# Patient Record
Sex: FEMALE | Race: WHITE | NOT HISPANIC OR LATINO | Employment: STUDENT | URBAN - METROPOLITAN AREA
[De-identification: names, ages, dates, MRNs, and addresses within clinical notes are randomized per-mention and may not be internally consistent; named-entity substitution may affect disease eponyms.]

---

## 2019-04-11 ENCOUNTER — OFFICE VISIT (OUTPATIENT)
Dept: FAMILY MEDICINE CLINIC | Facility: CLINIC | Age: 24
End: 2019-04-11
Payer: COMMERCIAL

## 2019-04-11 VITALS
SYSTOLIC BLOOD PRESSURE: 112 MMHG | HEART RATE: 84 BPM | WEIGHT: 130 LBS | BODY MASS INDEX: 23.92 KG/M2 | TEMPERATURE: 99 F | HEIGHT: 62 IN | DIASTOLIC BLOOD PRESSURE: 70 MMHG | RESPIRATION RATE: 16 BRPM

## 2019-04-11 DIAGNOSIS — K64.5 EXTERNAL THROMBOSED HEMORRHOIDS: Primary | ICD-10-CM

## 2019-04-11 PROCEDURE — 99203 OFFICE O/P NEW LOW 30 MIN: CPT | Performed by: NURSE PRACTITIONER

## 2019-06-17 ENCOUNTER — OFFICE VISIT (OUTPATIENT)
Dept: FAMILY MEDICINE CLINIC | Facility: CLINIC | Age: 24
End: 2019-06-17
Payer: COMMERCIAL

## 2019-06-17 VITALS
HEIGHT: 62 IN | RESPIRATION RATE: 16 BRPM | SYSTOLIC BLOOD PRESSURE: 104 MMHG | TEMPERATURE: 97.7 F | HEART RATE: 84 BPM | WEIGHT: 131 LBS | BODY MASS INDEX: 24.11 KG/M2 | DIASTOLIC BLOOD PRESSURE: 70 MMHG

## 2019-06-17 DIAGNOSIS — Z11.3 SCREENING FOR STD (SEXUALLY TRANSMITTED DISEASE): ICD-10-CM

## 2019-06-17 DIAGNOSIS — Z11.1 ENCOUNTER FOR PPD TEST: ICD-10-CM

## 2019-06-17 DIAGNOSIS — Z12.4 SCREENING FOR CERVICAL CANCER: ICD-10-CM

## 2019-06-17 DIAGNOSIS — Z23 NEED FOR VACCINATION: ICD-10-CM

## 2019-06-17 DIAGNOSIS — Z00.00 ANNUAL PHYSICAL EXAM: Primary | ICD-10-CM

## 2019-06-17 PROCEDURE — 90715 TDAP VACCINE 7 YRS/> IM: CPT

## 2019-06-17 PROCEDURE — 99395 PREV VISIT EST AGE 18-39: CPT | Performed by: NURSE PRACTITIONER

## 2019-06-17 PROCEDURE — 90472 IMMUNIZATION ADMIN EACH ADD: CPT

## 2019-06-17 PROCEDURE — 86580 TB INTRADERMAL TEST: CPT

## 2019-06-17 PROCEDURE — 90471 IMMUNIZATION ADMIN: CPT

## 2019-06-17 PROCEDURE — 90621 MENB-FHBP VACC 2/3 DOSE IM: CPT

## 2019-06-19 ENCOUNTER — CLINICAL SUPPORT (OUTPATIENT)
Dept: FAMILY MEDICINE CLINIC | Facility: CLINIC | Age: 24
End: 2019-06-19

## 2019-06-19 DIAGNOSIS — Z23 NEED FOR VACCINATION: Primary | ICD-10-CM

## 2019-06-19 LAB
INDURATION: 0 MM
TB SKIN TEST: NEGATIVE

## 2019-06-20 LAB
C TRACH RRNA CVX QL NAA+PROBE: NEGATIVE
CYTOLOGIST CVX/VAG CYTO: NORMAL
DX ICD CODE: NORMAL
Lab: NORMAL
N GONORRHOEA RRNA CVX QL NAA+PROBE: NEGATIVE
OTHER STN SPEC: NORMAL
OTHER STN SPEC: NORMAL
PATH REPORT.FINAL DX SPEC: NORMAL
SL AMB NOTE:: NORMAL
SL AMB SPECIMEN ADEQUACY: NORMAL
SL AMB TEST METHODOLOGY: NORMAL
T VAGINALIS RRNA SPEC QL NAA+PROBE: NEGATIVE

## 2019-08-06 ENCOUNTER — TELEPHONE (OUTPATIENT)
Dept: FAMILY MEDICINE CLINIC | Facility: CLINIC | Age: 24
End: 2019-08-06

## 2019-08-06 DIAGNOSIS — Z13.6 SCREENING FOR CARDIOVASCULAR CONDITION: ICD-10-CM

## 2019-08-06 DIAGNOSIS — L67.8 ABNORMAL FACIAL HAIR: ICD-10-CM

## 2019-08-06 DIAGNOSIS — N92.6 IRREGULAR MENSTRUATION: Primary | ICD-10-CM

## 2019-08-06 NOTE — TELEPHONE ENCOUNTER
Left a message on machine letting pt know order Is up front for  and I wrote fasting on the lab slip  No further action needed   Merle Stoner, [place]

## 2019-08-06 NOTE — TELEPHONE ENCOUNTER
Patient called and stated that she saw Amanda Garcia in June and at that time Amanda Garcia offered to order routine blood work and hormonal blood work  She would like to go for that blood work now  Please order routine and hormonal blood work for patient and call Cell when ready for       Thank you

## 2019-08-14 ENCOUNTER — TELEPHONE (OUTPATIENT)
Dept: FAMILY MEDICINE CLINIC | Facility: CLINIC | Age: 24
End: 2019-08-14

## 2019-08-14 DIAGNOSIS — N92.6 IRREGULAR MENSTRUATION: Primary | ICD-10-CM

## 2019-08-14 DIAGNOSIS — L67.8 ABNORMAL FACIAL HAIR: ICD-10-CM

## 2019-08-14 NOTE — TELEPHONE ENCOUNTER
Can you please call [facility] and check if they can add testosterone and DHEA levels to current blood work  Thanks   ESTEPHANIA Jaramillo

## 2019-08-14 NOTE — TELEPHONE ENCOUNTER
They added on DHEA however cannot add testosterone because no red top tube was drawn  [facility]

## 2019-08-16 ENCOUNTER — TELEPHONE (OUTPATIENT)
Dept: FAMILY MEDICINE CLINIC | Facility: CLINIC | Age: 24
End: 2019-08-16

## 2019-08-16 LAB
ALBUMIN SERPL-MCNC: 4.9 G/DL (ref 3.6–5.1)
ALBUMIN/GLOB SERPL: 1.6 (CALC) (ref 1–2.5)
ALP SERPL-CCNC: 67 U/L (ref 33–115)
ALT SERPL-CCNC: 12 U/L (ref 6–29)
AST SERPL-CCNC: 21 U/L (ref 10–30)
BASOPHILS # BLD AUTO: 34 CELLS/UL (ref 0–200)
BASOPHILS NFR BLD AUTO: 0.4 %
BILIRUB SERPL-MCNC: 0.8 MG/DL (ref 0.2–1.2)
BUN SERPL-MCNC: 12 MG/DL (ref 7–25)
BUN/CREAT SERPL: ABNORMAL (CALC) (ref 6–22)
CALCIUM SERPL-MCNC: 10.7 MG/DL (ref 8.6–10.2)
CHLORIDE SERPL-SCNC: 102 MMOL/L (ref 98–110)
CHOLEST SERPL-MCNC: 205 MG/DL
CHOLEST/HDLC SERPL: 2.9 (CALC)
CO2 SERPL-SCNC: 30 MMOL/L (ref 20–32)
CREAT SERPL-MCNC: 0.68 MG/DL (ref 0.5–1.1)
DHEA-S SERPL-MCNC: 622 MCG/DL (ref 18–391)
EOSINOPHIL # BLD AUTO: 298 CELLS/UL (ref 15–500)
EOSINOPHIL NFR BLD AUTO: 3.5 %
ERYTHROCYTE [DISTWIDTH] IN BLOOD BY AUTOMATED COUNT: 11.7 % (ref 11–15)
ESTRADIOL SERPL-MCNC: 253 PG/ML
FSH SERPL-ACNC: 4.8 MIU/ML
GLOBULIN SER CALC-MCNC: 3 G/DL (CALC) (ref 1.9–3.7)
GLUCOSE SERPL-MCNC: 83 MG/DL (ref 65–99)
HCT VFR BLD AUTO: 42.9 % (ref 35–45)
HDLC SERPL-MCNC: 70 MG/DL
HGB BLD-MCNC: 14.1 G/DL (ref 11.7–15.5)
LDLC SERPL CALC-MCNC: 118 MG/DL (CALC)
LH SERPL-ACNC: 21.5 MIU/ML
LYMPHOCYTES # BLD AUTO: 1479 CELLS/UL (ref 850–3900)
LYMPHOCYTES NFR BLD AUTO: 17.4 %
MCH RBC QN AUTO: 29.1 PG (ref 27–33)
MCHC RBC AUTO-ENTMCNC: 32.9 G/DL (ref 32–36)
MCV RBC AUTO: 88.6 FL (ref 80–100)
MONOCYTES # BLD AUTO: 774 CELLS/UL (ref 200–950)
MONOCYTES NFR BLD AUTO: 9.1 %
NEUTROPHILS # BLD AUTO: 5916 CELLS/UL (ref 1500–7800)
NEUTROPHILS NFR BLD AUTO: 69.6 %
NONHDLC SERPL-MCNC: 135 MG/DL (CALC)
PLATELET # BLD AUTO: 372 THOUSAND/UL (ref 140–400)
PMV BLD REES-ECKER: 10.4 FL (ref 7.5–12.5)
POTASSIUM SERPL-SCNC: 4.8 MMOL/L (ref 3.5–5.3)
PROLACTIN SERPL-MCNC: 14.7 NG/ML
PROT SERPL-MCNC: 7.9 G/DL (ref 6.1–8.1)
RBC # BLD AUTO: 4.84 MILLION/UL (ref 3.8–5.1)
REF LAB TEST NAME: NORMAL
REF LAB TEST: NORMAL
SL AMB CLIENT CONTACT: NORMAL
SL AMB EGFR AFRICAN AMERICAN: 144 ML/MIN/1.73M2
SL AMB EGFR NON AFRICAN AMERICAN: 124 ML/MIN/1.73M2
SODIUM SERPL-SCNC: 139 MMOL/L (ref 135–146)
TRIGL SERPL-MCNC: 73 MG/DL
TSH SERPL-ACNC: 1.62 MIU/L
WBC # BLD AUTO: 8.5 THOUSAND/UL (ref 3.8–10.8)

## 2019-08-16 NOTE — TELEPHONE ENCOUNTER
Patient called and blood work discussed  Advised on diet and exercise  Waiting for DHEA and will go for testosterone levels  If everything comes back normal, next will be following with Dr Kal Galindo (Endo)   ESTEPHANIA Brown

## 2019-08-20 ENCOUNTER — TELEPHONE (OUTPATIENT)
Dept: FAMILY MEDICINE CLINIC | Facility: CLINIC | Age: 24
End: 2019-08-20

## 2019-08-20 DIAGNOSIS — R79.89 ELEVATED DHEA: Primary | ICD-10-CM

## 2019-08-20 NOTE — TELEPHONE ENCOUNTER
Patient called back and DHEA results discussed, have not done the testosterone levels and will follow up with ESTEPHANIA Hernandez

## 2019-09-14 LAB — TESTOST SERPL-MCNC: 41 NG/DL (ref 2–45)

## 2019-09-24 ENCOUNTER — OFFICE VISIT (OUTPATIENT)
Dept: FAMILY MEDICINE CLINIC | Facility: CLINIC | Age: 24
End: 2019-09-24
Payer: COMMERCIAL

## 2019-09-24 VITALS
SYSTOLIC BLOOD PRESSURE: 106 MMHG | DIASTOLIC BLOOD PRESSURE: 68 MMHG | WEIGHT: 125 LBS | HEART RATE: 88 BPM | HEIGHT: 62 IN | TEMPERATURE: 103 F | BODY MASS INDEX: 23 KG/M2 | RESPIRATION RATE: 16 BRPM

## 2019-09-24 DIAGNOSIS — J02.9 EXUDATIVE PHARYNGITIS: Primary | ICD-10-CM

## 2019-09-24 LAB — S PYO AG THROAT QL: NEGATIVE

## 2019-09-24 PROCEDURE — 99213 OFFICE O/P EST LOW 20 MIN: CPT | Performed by: NURSE PRACTITIONER

## 2019-09-24 PROCEDURE — 87880 STREP A ASSAY W/OPTIC: CPT | Performed by: NURSE PRACTITIONER

## 2019-09-24 RX ORDER — AZITHROMYCIN 250 MG/1
TABLET, FILM COATED ORAL
Qty: 6 TABLET | Refills: 0 | Status: SHIPPED | OUTPATIENT
Start: 2019-09-24 | End: 2019-09-24

## 2019-09-24 RX ORDER — AMOXICILLIN 875 MG/1
875 TABLET, COATED ORAL 2 TIMES DAILY
Qty: 20 TABLET | Refills: 0 | Status: CANCELLED | OUTPATIENT
Start: 2019-09-24 | End: 2019-10-04

## 2019-09-24 RX ORDER — AZITHROMYCIN 200 MG/5ML
POWDER, FOR SUSPENSION ORAL
Qty: 38 ML | Refills: 0 | Status: SHIPPED | OUTPATIENT
Start: 2019-09-24 | End: 2019-09-29

## 2019-09-24 NOTE — PROGRESS NOTES
Assessment/Plan:    Have high fever, will only treat [facility] antibiotic at this time and if after fever free for 48 hours but still having throat discomfort, will call and will try steroids  Informed that mono is in differential but does not want to get checked as not involved in any kind of contact sports  1  Exudative pharyngitis  -     POCT rapid strepA  -     azithromycin (ZITHROMAX) 200 mg/5 mL suspension; Take 12 5 mL (500 mg total) by mouth daily for 1 day, THEN 6 25 mL (250 mg total) daily for 4 days  Recent Results (from the past 24 hour(s))   POCT rapid strepA    Collection Time: 09/24/19  2:59 PM   Result Value Ref Range     RAPID STREP A Negative Negative         BMI Counseling: Body mass index is 23 24 kg/m²  Discussed the patient's BMI with her  Patient Instructions: Take medication with food  It is important that you take the entire course of antibiotics prescribed  May also take a probiotic of your choice to maintain healthy GI tamiko  Can take some probiotic and y 1f  ogurt with the medication  Gargle with warm salt water for 5 minutes every 4 hours  Drink plenty of fluids at least 6 glasses of water a day  Can use some honey lemon tea  Call or follow up if symptoms are not better in 7 days  Return if symptoms worsen or fail to improve  Future Appointments   Date Time Provider Maddie Arriaga   9/26/2019  2:30  [facility]    12/19/2019 10:00 AM [facility] MEDICAL NURSE [facility]           Subjective:      Patient ID: Marcie Anna is a 27 y o  female  Chief Complaint   Patient presents with    Fever     lj    Chills    Sore Throat         Vitals:  /68   Pulse 88   Temp (!) 103 °F (39 4 °C)   Resp 16   Ht 5' 1 5" (1 562 m)   Wt 56 7 kg (125 lb)   BMI 23 24 kg/m²     HPI  Patient stated that started with sore throat couple of days and progressed to chills, fever and bodyches when doing something    Not taking any otc   Denies any sob and swallowing difficulty  The following portions of the patient's history were reviewed and updated as appropriate: allergies, current medications, past family history, past medical history, past social history, past surgical history and problem list       Review of Systems   Constitutional: Positive for fever  Negative for chills, diaphoresis, fatigue and unexpected weight change  HENT: Positive for sore throat  Negative for congestion, dental problem, drooling, ear discharge, ear pain, facial swelling, hearing loss, mouth sores, nosebleeds, postnasal drip, rhinorrhea, sinus pressure, sinus pain, sneezing, tinnitus, trouble swallowing and voice change  Respiratory: Negative for cough, chest tightness, shortness of breath and wheezing  Cardiovascular: Negative  Gastrointestinal: Negative for abdominal pain, constipation, diarrhea, nausea and vomiting  Musculoskeletal: Positive for myalgias (mild)  Skin: Negative  Neurological: Negative for dizziness, weakness, light-headedness and headaches  Hematological: Negative  Objective:    Social History     Tobacco Use   Smoking Status Never Smoker   Smokeless Tobacco Never Used       Allergies: No Known Allergies      Current Outpatient Medications   Medication Sig Dispense Refill    azithromycin (ZITHROMAX) 200 mg/5 mL suspension Take 12 5 mL (500 mg total) by mouth daily for 1 day, THEN 6 25 mL (250 mg total) daily for 4 days  38 mL 0     No current facility-administered medications for this visit  Physical Exam   Constitutional: She is oriented to person, place, and time  She appears well-developed and well-nourished  HENT:   Head: Normocephalic  Right Ear: Tympanic membrane, external ear and ear canal normal    Left Ear: Tympanic membrane, external ear and ear canal normal    Nose: Nose normal  Right sinus exhibits no maxillary sinus tenderness and no frontal sinus tenderness   Left sinus exhibits no maxillary sinus tenderness and no frontal sinus tenderness  Mouth/Throat: Mucous membranes are normal  Oropharyngeal exudate and posterior oropharyngeal erythema present  No posterior oropharyngeal edema  Tonsillar exudate  Bilateral tonsils are enlarged with mild exudate   Neck: Neck supple  Cardiovascular: Normal rate, regular rhythm and normal heart sounds  Pulmonary/Chest: Effort normal and breath sounds normal    Abdominal: Normal appearance and bowel sounds are normal  There is no hepatosplenomegaly  There is no tenderness  There is no rebound  Musculoskeletal: Normal range of motion  Lymphadenopathy:        Right cervical: No superficial cervical and no posterior cervical adenopathy present  Left cervical: No superficial cervical and no posterior cervical adenopathy present  Neurological: She is alert and oriented to person, place, and time  Skin: Skin is warm and dry  Psychiatric: She has a normal mood and affect  Her behavior is normal  Judgment and thought content normal    Vitals reviewed                    ESTEPHANIA Godinez

## 2019-09-24 NOTE — LETTER
September 24, 2019     Patient: Mina Baldwin   YOB: 1995   Date of Visit: 9/24/2019       To Whom it May Concern:    Mina Baldwin is under my professional care  She was seen in my office on 9/24/2019  Please excuse her from work on 9/25/2019  If you have any questions or concerns, please don't hesitate to call           Sincerely,          ESTEPHANIA Menchaca        CC: No Recipients

## 2019-09-24 NOTE — PATIENT INSTRUCTIONS
Take medication with food  It is important that you take the entire course of antibiotics prescribed  May also take a probiotic of your choice to maintain healthy GI tamiko  Can take some probiotic and y 1f  ogurt with the medication  Gargle with warm salt water for 5 minutes every 4 hours  Drink plenty of fluids at least 6 glasses of water a day  Can use some honey lemon tea  Call or follow up if symptoms are not better in 7 days

## 2019-09-26 ENCOUNTER — CONSULT (OUTPATIENT)
Dept: ENDOCRINOLOGY | Facility: CLINIC | Age: 24
End: 2019-09-26
Payer: COMMERCIAL

## 2019-09-26 VITALS
WEIGHT: 123 LBS | BODY MASS INDEX: 22.63 KG/M2 | SYSTOLIC BLOOD PRESSURE: 90 MMHG | HEIGHT: 62 IN | HEART RATE: 94 BPM | DIASTOLIC BLOOD PRESSURE: 68 MMHG

## 2019-09-26 DIAGNOSIS — N92.6 IRREGULAR MENSTRUATION: ICD-10-CM

## 2019-09-26 DIAGNOSIS — R79.89 ELEVATED DHEA: Primary | ICD-10-CM

## 2019-09-26 DIAGNOSIS — L68.0 HIRSUTISM: ICD-10-CM

## 2019-09-26 PROCEDURE — 99244 OFF/OP CNSLTJ NEW/EST MOD 40: CPT | Performed by: INTERNAL MEDICINE

## 2019-09-26 NOTE — PROGRESS NOTES
ENDOCRINOLOGY  NEW PATIENT H&P     ? Reason for Endocrine Consult/Chief Complaint: hirsutism and irregular menstrual cycle evaluation     Referring Provider: ESTEPHANIA Tilley  Consults       Medical Decision Making:     Impression  1  Hirsutism  2  Irregular menstrual cycles  3  Elevated DHEAS  4  Hypercalcemia mild  5  HLD    Recommendations:  ?  I discussed the differential diagnosis of hirsutism and irregular menstrual cycles and elevated DHEAS with the patient  These include PCOS vs  Adrenal lesion producing high DHEAS levels vs  Nonclassical congential adrenal hyperplasia  Her prior labs indicate top normal testosterone level, a very elevated DHEAS level, and a high LH/FSH level (more indicative of PCOS)  The reproductive labs checked in August might have been done a little after midcycle so I'd like to get day 3 menstrual cycle bleeding labs FSH, LH, total testosterone, 17 OH progesterone (to evaluate for CAH), hCG (pregnancy), and repeat DHEAS  If she does not have any menstrual bleeding in the next 2-3 weeks I instructed her to have the labs done anyway since she can frequently have irregular cycles  If these results are persistent then due to the very high DHEAS level >500 she will need adrenal imaging to make sure she does not have an adrenal lesion resulting in hyperandrogenism  If her DHEAS level normalizes and the 17 OH progesterone is normal then she likely has PCOS and can then discuss therapy options with her and have her see GYN for potential OCPs  Hirsutism- if PCOS OCPs should help improve this however will also refer her to dermatology to discuss aldactone therapy and laser options based on her preference on how bothersome it is for her     Hypercalcemia- mild 10 7 on most recent labs, repeat CMP to see if persistent     HLD- mildly elevated LDL, continue to monitor       RTC in 4 weeks     Simon PINA          History of Present Illness:   Mrs Aye Saldana is a 24yr old female who presents for hirsutism, irregular menstrual cycle evaluation  Menarche at age 15  Irregular menstrual cycles always abnormal  Some years only 5 menstrual cycles a year  Has worsening acne and body hair growth face and arms and legs  Used to wax and tweezing now, occasional shaving or plucking  Feels like worsening on face  Some thickening of hair on legs  Never been on OCPs  Some lower tone voice noticed  Some weight variability, recent weight loss  No purple stretch marks on belly  Had labs done in [address] [place], [place] [address]y  [address]  July menstrual cycle July 18th-21st then next one 27th-31st of August     No plans for conception  Some increase in sweating over past few months  ?  PMH-none   PSH-none   FHx-sister with irregular menstrual cycle, sister down syndrome and congenital heart disorder  SHx-neg x 2, social ETOH, working at [employer]     ? Review of Systems:     Review of Systems   Constitutional: Negative for appetite change, chills, diaphoresis, fatigue, fever and unexpected weight change  HENT: Negative for congestion, ear pain, hearing loss, rhinorrhea, sinus pressure, sinus pain, sore throat, trouble swallowing and voice change  Eyes: Negative for photophobia, redness and visual disturbance  Respiratory: Negative for apnea, cough, chest tightness, shortness of breath, wheezing and stridor  Cardiovascular: Negative for chest pain, palpitations and leg swelling  Gastrointestinal: Negative for abdominal distention, abdominal pain, constipation, diarrhea, nausea and vomiting  Endocrine: Negative for cold intolerance, heat intolerance, polydipsia, polyphagia and polyuria  Genitourinary: Negative for difficulty urinating, dysuria, flank pain, frequency, hematuria and urgency  +abnormal menstrual cycles  Musculoskeletal: Negative for arthralgias, back pain, gait problem, joint swelling and myalgias  Skin: Negative for color change, pallor, rash and wound   +hirsutism, acne  Allergic/Immunologic: Negative for immunocompromised state  Neurological: Negative for dizziness, tremors, syncope, weakness, light-headedness and headaches  Hematological: Negative for adenopathy  Does not bruise/bleed easily  Psychiatric/Behavioral: Negative for confusion and sleep disturbance  The patient is not nervous/anxious  ? Patient History:   History reviewed  No pertinent past medical history    Past Surgical History:   Procedure Laterality Date    NO PAST SURGERIES       Social History     Socioeconomic History    Marital status: Single     Spouse name: Not on file    Number of children: Not on file    Years of education: Not on file    Highest education level: Not on file   Occupational History    Not on file   Social Needs    Financial resource strain: Not on file    Food insecurity:     Worry: Not on file     Inability: Not on file    Transportation needs:     Medical: Not on file     Non-medical: Not on file   Tobacco Use    Smoking status: Never Smoker    Smokeless tobacco: Never Used   Substance and Sexual Activity    Alcohol use: Yes     Comment: socially    Drug use: Never     Comment: No drug use - As per [employer]     Sexual activity: Not Currently   Lifestyle    Physical activity:     Days per week: Not on file     Minutes per session: Not on file    Stress: Not on file   Relationships    Social connections:     Talks on phone: Not on file     Gets together: Not on file     Attends Synagogue service: Not on file     Active member of club or organization: Not on file     Attends meetings of clubs or organizations: Not on file     Relationship status: Not on file    Intimate partner violence:     Fear of current or ex partner: Not on file     Emotionally abused: Not on file     Physically abused: Not on file     Forced sexual activity: Not on file   Other Topics Concern    Not on file   Social History Narrative    Not on file     Family History   Problem Relation Age of Onset    Skin cancer Maternal Grandmother     No Known Problems Mother     No Known Problems Father     No Known Problems Maternal Grandfather     No Known Problems Paternal Grandmother     No Known Problems Paternal Grandfather     No Known Problems Family     No Known Problems Family        Current Medications: At the time this note was written these were the medications the patient was on  Current Outpatient Medications   Medication Sig Dispense Refill    azithromycin (ZITHROMAX) 200 mg/5 mL suspension Take 12 5 mL (500 mg total) by mouth daily for 1 day, THEN 6 25 mL (250 mg total) daily for 4 days  38 mL 0     No current facility-administered medications for this visit  Allergies: Patient has no known allergies  Physical Exam:   Vital Signs:   BP 90/68   Pulse 94   Ht 5' 1 5" (1 562 m)   Wt 55 8 kg (123 lb)   BMI 22 86 kg/m²     Physical Exam   Constitutional: She is oriented to person, place, and time  She appears well-developed and well-nourished  HENT:   Head: Normocephalic and atraumatic  Nose: Nose normal    Mouth/Throat: Oropharynx is clear and moist  No oropharyngeal exudate  Eyes: Pupils are equal, round, and reactive to light  Conjunctivae and EOM are normal    Neck: Normal range of motion  Neck supple  No thyromegaly present  Cardiovascular: Normal rate, regular rhythm and normal heart sounds  Exam reveals no gallop and no friction rub  No murmur heard  Pulmonary/Chest: Effort normal and breath sounds normal  No stridor  No respiratory distress  She has no wheezes  She has no rales  Abdominal: Soft  Bowel sounds are normal  She exhibits no distension and no mass  There is no tenderness  There is no rebound and no guarding  Musculoskeletal: Normal range of motion  She exhibits no edema  Lymphadenopathy:     She has no cervical adenopathy  Neurological: She is alert and oriented to person, place, and time  Skin: Skin is warm and dry   No rash noted  No erythema    +facial hair, bilateral arm hair both with terminal hairs     Psychiatric: She has a normal mood and affect  Her behavior is normal  Judgment and thought content normal         Labs and Imaging:      Component      Latest Ref Rng & Units 8/13/2019 9/10/2019   White Blood Cell Count      3 8 - 10 8 Thousand/uL 8 5    Red Blood Cell Count      3 80 - 5 10 Million/uL 4 84    Hemoglobin      11 7 - 15 5 g/dL 14 1    HCT      35 0 - 45 0 % 42 9    MCV      80 0 - 100 0 fL 88 6    MCH      27 0 - 33 0 pg 29 1    MCHC        32 0 - 36 0 g/dL 32 9    RDW      11 0 - 15 0 % 11 7    Platelet Count      707 - 400 Thousand/uL 372    SL AMB MPV      7 5 - 12 5 fL 10 4    Neutrophils (Absolute)      1,500 - 7,800 cells/uL 5,916    Lymphocytes (Absolute)      850 - 3,900 cells/uL 1,479    Monocytes (Absolute)      200 - 950 cells/uL 774    Eosinophils (Absolute)      15 - 500 cells/uL 298    Basophils ABS      0 - 200 cells/uL 34    Neutrophils      % 69 6    Lymphocytes      % 17 4    Monocytes      % 9 1    Eosinophils      % 3 5    Basophils PCT      % 0 4    Glucose, Random      65 - 99 mg/dL 83    BUN      7 - 25 mg/dL 12    Creatinine      0 50 - 1 10 mg/dL 0 68    eGFR Non       > OR = 60 mL/min/1 73m2 124    eGFR       > OR = 60 mL/min/1 73m2 144    SL AMB BUN/CREATININE RATIO      6 - 22 (calc) NOT APPLICABLE    Sodium      135 - 146 mmol/L 139    Potassium      3 5 - 5 3 mmol/L 4 8    Chloride      98 - 110 mmol/L 102    CO2      20 - 32 mmol/L 30    SL AMB CALCIUM      8 6 - 10 2 mg/dL 10 7 (H)    Total Protein      6 1 - 8 1 g/dL 7 9    Albumin      3 6 - 5 1 g/dL 4 9    Globulin      1 9 - 3 7 g/dL (calc) 3 0    Albumin/Globulin Ratio      1 0 - 2 5 (calc) 1 6    TOTAL BILIRUBIN      0 2 - 1 2 mg/dL 0 8    Alkaline Phosphatase      33 - 115 U/L 67    AST      10 - 30 U/L 21    ALT      6 - 29 U/L 12    Cholesterol      <200 mg/dL 205 (H)    HDL      >50 mg/dL 70 Triglycerides      <150 mg/dL 73    LDL Direct      mg/dL (calc) 118 (H)    Chol HDLC Ratio      <5 0 (calc) 2 9    Non-HDL Cholesterol      <130 mg/dL (calc) 135 (H)    FSH, POC      mIU/mL 4 8    LUTEINIZING HORMONE      mIU/mL 21 5    PROLACTIN      ng/mL 14 7    ESTRADIOL LEVEL      pg/mL 253    TSH W/RFX TO FREE T4      mIU/L 1 62    DHEA-SO4      18 - 391 mcg/dL 622 (H)    Testosterone, Total, LC/MS      2 - 45 ng/dL  41     ?

## 2019-09-26 NOTE — PATIENT INSTRUCTIONS
Have labs done in the next 2-3 weeks if you have menstrual cycle bleeding, have them done day 3 of bleeding if possible, if you do not get menstrual cycle still have labs done in the next 2-3 weeks    Follow up in 4 weeks

## 2019-10-22 LAB
17OHP SERPL-MCNC: 121 NG/DL
25(OH)D3 SERPL-MCNC: 30 NG/ML (ref 30–100)
ALBUMIN SERPL-MCNC: 4.4 G/DL (ref 3.6–5.1)
ALBUMIN/GLOB SERPL: 1.5 (CALC) (ref 1–2.5)
ALP SERPL-CCNC: 63 U/L (ref 33–115)
ALT SERPL-CCNC: 19 U/L (ref 6–29)
AST SERPL-CCNC: 26 U/L (ref 10–30)
B-HCG SERPL-ACNC: <2 MIU/ML
BILIRUB SERPL-MCNC: 1 MG/DL (ref 0.2–1.2)
BUN SERPL-MCNC: 13 MG/DL (ref 7–25)
BUN/CREAT SERPL: NORMAL (CALC) (ref 6–22)
CALCIUM SERPL-MCNC: 10.1 MG/DL (ref 8.6–10.2)
CHLORIDE SERPL-SCNC: 104 MMOL/L (ref 98–110)
CO2 SERPL-SCNC: 29 MMOL/L (ref 20–32)
CREAT SERPL-MCNC: 0.63 MG/DL (ref 0.5–1.1)
DHEA-S SERPL-MCNC: 540 MCG/DL (ref 18–391)
FSH SERPL-ACNC: 6.8 MIU/ML
GLOBULIN SER CALC-MCNC: 2.9 G/DL (CALC) (ref 1.9–3.7)
GLUCOSE SERPL-MCNC: 79 MG/DL (ref 65–99)
LH SERPL-ACNC: 32.3 MIU/ML
POTASSIUM SERPL-SCNC: 4.9 MMOL/L (ref 3.5–5.3)
PROT SERPL-MCNC: 7.3 G/DL (ref 6.1–8.1)
SL AMB EGFR AFRICAN AMERICAN: 148 ML/MIN/1.73M2
SL AMB EGFR NON AFRICAN AMERICAN: 127 ML/MIN/1.73M2
SODIUM SERPL-SCNC: 139 MMOL/L (ref 135–146)
TESTOST FREE SERPL-MCNC: 6.3 PG/ML (ref 0.1–6.4)
TESTOST SERPL-MCNC: 51 NG/DL (ref 2–45)

## 2019-10-24 ENCOUNTER — OFFICE VISIT (OUTPATIENT)
Dept: ENDOCRINOLOGY | Facility: CLINIC | Age: 24
End: 2019-10-24
Payer: COMMERCIAL

## 2019-10-24 VITALS
WEIGHT: 126 LBS | SYSTOLIC BLOOD PRESSURE: 112 MMHG | BODY MASS INDEX: 23.19 KG/M2 | HEART RATE: 97 BPM | DIASTOLIC BLOOD PRESSURE: 60 MMHG | HEIGHT: 62 IN

## 2019-10-24 DIAGNOSIS — E28.2 PCOS (POLYCYSTIC OVARIAN SYNDROME): ICD-10-CM

## 2019-10-24 DIAGNOSIS — E78.00 PURE HYPERCHOLESTEROLEMIA: ICD-10-CM

## 2019-10-24 DIAGNOSIS — R79.89 ELEVATED DHEA: Primary | ICD-10-CM

## 2019-10-24 DIAGNOSIS — N92.6 IRREGULAR MENSTRUATION: ICD-10-CM

## 2019-10-24 DIAGNOSIS — L68.0 HIRSUTISM: ICD-10-CM

## 2019-10-24 DIAGNOSIS — E83.52 HYPERCALCEMIA: ICD-10-CM

## 2019-10-24 PROCEDURE — 99214 OFFICE O/P EST MOD 30 MIN: CPT | Performed by: INTERNAL MEDICINE

## 2019-10-24 NOTE — PROGRESS NOTES
ENDOCRINOLOGY  FOLLOW UP VISIT      Reason for Endocrine Consult/Chief Complaint: elevated DHEAS        ? Medical Decision Making:     Impression  1  Hirsutism  2  Irregular menstrual cycles  3  Elevated DHEAS  4  Hypercalcemia mild  5  HLD     Recommendations:  ?  I discussed her most recent labs with her  Her DHEAS level was still high >500 and her LH/FSH ratio was elevated, 17 OH progesterone not elevated  She likely has PCOS however we need to r/o DHEAS secreting adrenal lesion  Will check MRI abdomen without contrast   If no adrenal lesions then will refer her to GYN to discuss OCP therapy for cycle regulation and hirsutism treatment  BG normal, no need for metformin right now  Hirsutism- if PCOS OCPs 1st line therapy followed by aldactone and mechanical therapies like laser or electrolysis      Hypercalcemia- top normal but improved, continue to monitor     HLD- mildly elevated LDL, continue to monitor, counseled on dietary changes         RTC in 3 months    Edel PINA            History of Present Illness:   Mrs Jake Dinero is a 24yr old female who presents for hirsutism, irregular menstrual cycle evaluation       Menarche at age 15  Irregular menstrual cycles always abnormal  Some years only 5 menstrual cycles a year       Has worsening acne and body hair growth face and arms and legs  Used to wax and tweezing now, occasional shaving or plucking  Feels like worsening on face  Some thickening of hair on legs       Never been on OCPs     Some lower tone voice noticed       Some weight variability, recent weight loss  No purple stretch marks on belly       Had labs done in Mid-august 2019 July menstrual cycle July 18th-21st then next one 27th-31st of August      No plans for conception       Some increase in sweating over past few months     ?  PMH-none   PSH-none   FHx-sister with irregular menstrual cycle, sister down syndrome and congenital heart disorder  SHx-neg x 2, social ETOH, working at [employer]    Events since last visit:   had last menstrual cycle August 27th-31st   Labs done not on day 3  ? Review of Systems:   Review of Systems   Constitutional: Negative for appetite change, chills, diaphoresis, fatigue, fever and unexpected weight change  HENT: Negative for congestion, ear pain, hearing loss, rhinorrhea, sinus pressure, sinus pain, sore throat, trouble swallowing and voice change  Eyes: Negative for photophobia, redness and visual disturbance  Respiratory: Negative for apnea, cough, chest tightness, shortness of breath, wheezing and stridor  Cardiovascular: Negative for chest pain, palpitations and leg swelling  Gastrointestinal: Negative for abdominal distention, abdominal pain, constipation, diarrhea, nausea and vomiting  Endocrine: Negative for cold intolerance, heat intolerance, polydipsia, polyphagia and polyuria  Genitourinary: +abnormal menstrual cycles  Musculoskeletal: Negative for arthralgias, back pain, gait problem, joint swelling and myalgias  Skin: +hirsutism, acne   Allergic/Immunologic: Negative for immunocompromised state  Neurological: Negative for dizziness, tremors, syncope, weakness, light-headedness and headaches  Hematological: Negative for adenopathy  Does not bruise/bleed easily  Psychiatric/Behavioral: Negative for confusion and sleep disturbance  The patient is not nervous/anxious  Patient History:   History reviewed  No pertinent past medical history    Past Surgical History:   Procedure Laterality Date    NO PAST SURGERIES       Social History     Socioeconomic History    Marital status: Single     Spouse name: Not on file    Number of children: Not on file    Years of education: Not on file    Highest education level: Not on file   Occupational History    Not on file   Social Needs    Financial resource strain: Not on file    Food insecurity:     Worry: Not on file     Inability: Not on file    Transportation needs:     Medical: Not on file     Non-medical: Not on file   Tobacco Use    Smoking status: Never Smoker    Smokeless tobacco: Never Used   Substance and Sexual Activity    Alcohol use: Yes     Comment: socially    Drug use: Never     Comment: No drug use - As per [employer]scripts     Sexual activity: Not Currently   Lifestyle    Physical activity:     Days per week: Not on file     Minutes per session: Not on file    Stress: Not on file   Relationships    Social connections:     Talks on phone: Not on file     Gets together: Not on file     Attends Episcopal service: Not on file     Active member of club or organization: Not on file     Attends meetings of clubs or organizations: Not on file     Relationship status: Not on file    Intimate partner violence:     Fear of current or ex partner: Not on file     Emotionally abused: Not on file     Physically abused: Not on file     Forced sexual activity: Not on file   Other Topics Concern    Not on file   Social History Narrative    Not on file     Family History   Problem Relation Age of Onset    Skin cancer Maternal Grandmother     No Known Problems Mother     No Known Problems Father     No Known Problems Maternal Grandfather     No Known Problems Paternal Grandmother     No Known Problems Paternal Grandfather     No Known Problems Family     No Known Problems Family        Current Medications: At the time this note was written these were the medications the patient was on  No current outpatient medications on file  No current facility-administered medications for this visit  Allergies: Patient has no known allergies  Physical Exam:   Vital Signs:   /60   Pulse 97   Ht 5' 1 5" (1 562 m)   Wt 57 2 kg (126 lb)   BMI 23 42 kg/m²     Physical Exam   Constitutional: She is oriented to person, place, and time  She appears well-developed and well-nourished  HENT:   Head: Normocephalic and atraumatic     Nose: Nose normal    Mouth/Throat: Oropharynx is clear and moist  No oropharyngeal exudate  Eyes: Pupils are equal, round, and reactive to light  Conjunctivae and EOM are normal  No scleral icterus  Neck: Normal range of motion  Neck supple  No thyromegaly present  Cardiovascular: Normal rate, regular rhythm and normal heart sounds  Exam reveals no gallop and no friction rub  No murmur heard  Pulmonary/Chest: Effort normal and breath sounds normal  No stridor  No respiratory distress  She has no wheezes  She has no rales  Abdominal: Soft  Bowel sounds are normal  She exhibits no distension and no mass  There is no tenderness  There is no rebound and no guarding  Musculoskeletal: Normal range of motion  She exhibits no edema  Lymphadenopathy:     She has no cervical adenopathy  Neurological: She is alert and oriented to person, place, and time  Skin: Skin is warm and dry  No rash noted  No erythema  No pallor  +facial hirsutism   Psychiatric: She has a normal mood and affect   Her behavior is normal  Judgment and thought content normal         Labs and Imaging:      Component      Latest Ref Rng & Units 10/17/2019   Glucose, Random      65 - 99 mg/dL 79   BUN      7 - 25 mg/dL 13   Creatinine      0 50 - 1 10 mg/dL 0 63   eGFR Non       > OR = 60 mL/min/1 73m2 127   eGFR       > OR = 60 mL/min/1 73m2 148   SL AMB BUN/CREATININE RATIO      6 - 22 (calc) NOT APPLICABLE   Sodium      135 - 146 mmol/L 139   Potassium      3 5 - 5 3 mmol/L 4 9   Chloride      98 - 110 mmol/L 104   CO2      20 - 32 mmol/L 29   SL AMB CALCIUM      8 6 - 10 2 mg/dL 10 1   Total Protein      6 1 - 8 1 g/dL 7 3   Albumin      3 6 - 5 1 g/dL 4 4   Globulin      1 9 - 3 7 g/dL (calc) 2 9   Albumin/Globulin Ratio      1 0 - 2 5 (calc) 1 5   TOTAL BILIRUBIN      0 2 - 1 2 mg/dL 1 0   Alkaline Phosphatase      33 - 115 U/L 63   AST      10 - 30 U/L 26   ALT      6 - 29 U/L 19   Testosterone, Total, LC/MS      2 - 45 ng/dL 51 (H) TESTOSTERONE FREE      0 1 - 6 4 pg/mL 6 3   17-OH PROGESTERONE      see note ng/dL 121   DHEA-SO4      18 - 391 mcg/dL 540 (H)   FSH, POC      mIU/mL 6 8   LUTEINIZING HORMONE      mIU/mL 32 3   HCG QUANTITATIVE      mIU/mL <2   EXTERNAL VITAMIN D,25      30 - 100 ng/mL 30

## 2019-12-02 ENCOUNTER — TELEPHONE (OUTPATIENT)
Dept: ENDOCRINOLOGY | Facility: CLINIC | Age: 24
End: 2019-12-02

## 2019-12-02 NOTE — TELEPHONE ENCOUNTER
[facility] INPATIENT  and submitted prior auth for MRI Abdomen  It is being reviewed    Case#: [id number]    Faxed office note to Elfe[employer] at [phone]

## 2019-12-09 ENCOUNTER — TELEPHONE (OUTPATIENT)
Dept: ENDOCRINOLOGY | Facility: CLINIC | Age: 24
End: 2019-12-09

## 2019-12-09 DIAGNOSIS — R79.89 ELEVATED DHEA: Primary | ICD-10-CM

## 2019-12-09 DIAGNOSIS — E28.8 HYPERANDROGENISM: ICD-10-CM

## 2019-12-09 DIAGNOSIS — L68.0 HIRSUTISM: ICD-10-CM

## 2019-12-09 NOTE — TELEPHONE ENCOUNTER
I did [id number] with insurance company to try to get MRI abdomen approved  They will not approve the the MRI abdomen  They will approve a CT scan without contrast  Will order that instead  CPT code [facility] #[id number]    Noe Quinones, can you call patient and let her know to cancel MRI and schedule CT scan, I tried calling her but there was no      Iam PINA    Endocrinology

## 2019-12-09 NOTE — TELEPHONE ENCOUNTER
[facility] , advised I received denial letter for MRI Abdomen, asked why it was denied  Was advised to have Dr Savana Valdez call to do a peer to peer

## 2019-12-10 NOTE — TELEPHONE ENCOUNTER
Left message with sister to cancel MRI and schedule CT abdomen wo contrast  She can call the office with questions

## 2021-04-08 ENCOUNTER — TELEPHONE (OUTPATIENT)
Dept: FAMILY MEDICINE CLINIC | Facility: CLINIC | Age: 26
End: 2021-04-08

## 2021-04-08 NOTE — TELEPHONE ENCOUNTER
Patient is due for physical as last time seen in office in 2019 unless seeing different PCP   ESTEPHANIA Lockett

## 2021-04-08 NOTE — TELEPHONE ENCOUNTER
Spoke to patient, she said she will be continuing care with Tera Pedersen  Patient said she would call back to schedule when she is ready  Closing task     Tomas Greer MA

## 2021-06-14 ENCOUNTER — OFFICE VISIT (OUTPATIENT)
Dept: FAMILY MEDICINE CLINIC | Facility: CLINIC | Age: 26
End: 2021-06-14
Payer: COMMERCIAL

## 2021-06-14 VITALS
OXYGEN SATURATION: 99 % | RESPIRATION RATE: 18 BRPM | HEIGHT: 62 IN | DIASTOLIC BLOOD PRESSURE: 60 MMHG | HEART RATE: 87 BPM | SYSTOLIC BLOOD PRESSURE: 106 MMHG | WEIGHT: 122 LBS | BODY MASS INDEX: 22.45 KG/M2 | TEMPERATURE: 97.4 F

## 2021-06-14 DIAGNOSIS — N89.8 VAGINAL ITCHING: ICD-10-CM

## 2021-06-14 DIAGNOSIS — Z01.419 ENCOUNTER FOR GYNECOLOGICAL EXAMINATION: ICD-10-CM

## 2021-06-14 DIAGNOSIS — Z00.00 ROUTINE ADULT HEALTH MAINTENANCE: Primary | ICD-10-CM

## 2021-06-14 DIAGNOSIS — Z13.1 SCREENING FOR DIABETES MELLITUS: ICD-10-CM

## 2021-06-14 DIAGNOSIS — Z13.6 SCREENING FOR CARDIOVASCULAR CONDITION: ICD-10-CM

## 2021-06-14 DIAGNOSIS — Z13.0 SCREENING FOR DEFICIENCY ANEMIA: ICD-10-CM

## 2021-06-14 PROCEDURE — 1036F TOBACCO NON-USER: CPT | Performed by: NURSE PRACTITIONER

## 2021-06-14 PROCEDURE — 3008F BODY MASS INDEX DOCD: CPT | Performed by: NURSE PRACTITIONER

## 2021-06-14 PROCEDURE — 99395 PREV VISIT EST AGE 18-39: CPT | Performed by: NURSE PRACTITIONER

## 2021-06-14 PROCEDURE — 3725F SCREEN DEPRESSION PERFORMED: CPT | Performed by: NURSE PRACTITIONER

## 2021-06-14 RX ORDER — FLUCONAZOLE 150 MG/1
150 TABLET ORAL ONCE
Qty: 1 TABLET | Refills: 0 | Status: SHIPPED | OUTPATIENT
Start: 2021-06-14 | End: 2021-06-14

## 2021-06-14 NOTE — PROGRESS NOTES
FAMILY PRACTICE HEALTH MAINTENANCE OFFICE VISIT  [facility] - [facility]    NAME: Arvilla Councilman  AGE: 27 y o  SEX: female  : 1995     DATE: 2021    Assessment and Plan     1  Routine adult health maintenance    2  Encounter for gynecological examination  -     Ambulatory referral to Obstetrics / Gynecology; Future    3  Vaginal itching  -     fluconazole (DIFLUCAN) 150 mg tablet; Take 1 tablet (150 mg total) by mouth once for 1 dose    4  Screening for diabetes mellitus  -     Comprehensive metabolic panel; Future    5  Screening for cardiovascular condition  -     Lipid Panel with Direct LDL reflex; Future    6  Screening for deficiency anemia  -     CBC; Future        · Patient Counseling:   · Nutrition: Stressed importance of a well balanced diet, moderation of sodium/saturated fat, caloric balance and sufficient intake of fiber  · Exercise: Stressed the importance of regular exercise with a goal of 150 minutes per week  · Dental Health: Discussed daily flossing and brushing and regular dental visits   · Sexuality: Discussed sexually transmitted infections, use of condoms and prevention of unintended pregnancy  · Alcohol Use:  Recommended moderation of alcohol intake  · Injury Prevention: Discussed Safety Belts, Safety Helmets, and Smoke Detectors    · Immunizations reviewed: Risks and Benefits discussed and of HPV vaccination and will check with insurance and then will schedule  · Discussed benefits of:  Cervical Cancer screening and Screening labs   BMI Counseling: Body mass index is 22 31 kg/m²  Discussed with patient's BMI with her       Return in about 1 year (around 2022) for Annual physical         Chief Complaint     Chief Complaint   Patient presents with    Physical Exam     [id number]       History of Present Illness     HPI    Well Adult Physical   Patient here for a comprehensive physical exam   Stated that having vaginal itching with thick discharge from couple of days and denies any new sexual partners and STD concern but concerned about fungal infection, wants to do medication at this time and then will follow up with gynecologist if still having issues  Stated that her periods have been still irregular and still has facial hair, as last time based on blood work with elevated DHEA levels, followed up with bon who advised her to follow up with gynecologist for possible PCOS treatment and will follow up soon  Now as previously did not do it due to insurance  Denies any family h/o colon/ breast/pancreatic cancer in first degree relatives  Refused HIV testing and hepatitis c screening    Diet and Physical Activity  Diet: well balanced diet  Exercise: rarely      Depression Screen  PHQ-9 Depression Screening    PHQ-9:   Frequency of the following problems over the past two weeks:      Little interest or pleasure in doing things: 0 - not at all  Feeling down, depressed, or hopeless: 0 - not at all  PHQ-2 Score: 0          General Health  Hearing: Normal:  bilateral  Vision: no vision problems, most recent eye exam <1 year and wears glasses  Dental: no dental visits for >1 year    Reproductive Health  Irregular Periods and Follows with gynecologist      The following portions of the patient's history were reviewed and updated as appropriate: allergies, current medications, past family history, past medical history, past social history, past surgical history and problem list     Review of Systems     Review of Systems   Constitutional: Negative  HENT: Negative  Eyes: Negative  Respiratory: Negative  Cardiovascular: Negative  Gastrointestinal: Negative  Endocrine: Negative  Genitourinary: Positive for vaginal discharge  Negative for decreased urine volume, difficulty urinating, dyspareunia, dysuria, enuresis, flank pain, frequency, genital sores, hematuria, menstrual problem, pelvic pain, urgency, vaginal bleeding and vaginal pain          As noted in HPI     Musculoskeletal: Negative  Skin: Negative  Allergic/Immunologic: Negative  Neurological: Negative  Hematological: Negative  Psychiatric/Behavioral: Negative  Past Medical History     History reviewed  No pertinent past medical history  Past Surgical History     Past Surgical History:   Procedure Laterality Date    NO PAST SURGERIES         Social History     Social History     Socioeconomic History    Marital status: Single     Spouse name: None    Number of children: None    Years of education: None    Highest education level: None   Occupational History    None   Tobacco Use    Smoking status: Never Smoker    Smokeless tobacco: Never Used   Vaping Use    Vaping Use: Never used   Substance and Sexual Activity    Alcohol use: Not Currently    Drug use: Never     Comment: No drug use - As per [employer]scripts     Sexual activity: Not Currently   Other Topics Concern    None   Social History Narrative    None     Social Determinants of Health     Financial Resource Strain:     Difficulty of Paying Living Expenses:    Food Insecurity:     Worried About Running Out of Food in the Last Year:     Ran Out of Food in the Last Year:    Transportation Needs:     Lack of Transportation (Medical):      Lack of Transportation (Non-Medical):    Physical Activity:     Days of Exercise per Week:     Minutes of Exercise per Session:    Stress:     Feeling of Stress :    Social Connections:     Frequency of Communication with Friends and Family:     Frequency of Social Gatherings with Friends and Family:     Attends Latter-day Services:     Active Member of Clubs or Organizations:     Attends Club or Organization Meetings:     Marital Status:    Intimate Partner Violence:     Fear of Current or Ex-Partner:     Emotionally Abused:     Physically Abused:     Sexually Abused:        Family History     Family History   Problem Relation Age of Onset    Skin cancer Maternal Grandmother     No Known Problems Mother     No Known Problems Father     No Known Problems Maternal Grandfather     No Known Problems Paternal Grandmother     No Known Problems Paternal Grandfather     No Known Problems Family     No Known Problems Family        Current Medications       Current Outpatient Medications:     fluconazole (DIFLUCAN) 150 mg tablet, Take 1 tablet (150 mg total) by mouth once for 1 dose, Disp: 1 tablet, Rfl: 0     Allergies     No Known Allergies    Objective     /60   Pulse 87   Temp (!) 97 4 °F (36 3 °C)   Resp 18   Ht 5' 2" (1 575 m)   Wt 55 3 kg (122 lb)   LMP 04/14/2021 (Approximate)   SpO2 99%   BMI 22 31 kg/m²      Physical Exam  Vitals reviewed  Constitutional:       Appearance: Normal appearance  HENT:      Head: Normocephalic and atraumatic  Salivary Glands: Right salivary gland is not tender  Left salivary gland is not tender  Right Ear: Tympanic membrane, ear canal and external ear normal       Left Ear: Tympanic membrane, ear canal and external ear normal  There is no impacted cerumen  Nose: Nose normal  No congestion  Mouth/Throat:      Mouth: Mucous membranes are moist       Pharynx: No oropharyngeal exudate or posterior oropharyngeal erythema  Eyes:      General: Lids are normal          Right eye: No discharge or hordeolum  Left eye: No discharge or hordeolum  Conjunctiva/sclera: Conjunctivae normal       Right eye: Right conjunctiva is not injected  No exudate or hemorrhage  Left eye: Left conjunctiva is not injected  No exudate or hemorrhage  Pupils: Pupils are equal, round, and reactive to light  Neck:      Thyroid: No thyromegaly or thyroid tenderness  Cardiovascular:      Rate and Rhythm: Normal rate and regular rhythm  Pulses: Normal pulses  Heart sounds: Normal heart sounds  Pulmonary:      Effort: Pulmonary effort is normal       Breath sounds: Normal breath sounds     Chest: Chest wall: No deformity, swelling, tenderness, crepitus or edema  There is no dullness to percussion  Abdominal:      General: Abdomen is flat  Bowel sounds are normal       Palpations: Abdomen is soft  Tenderness: There is no abdominal tenderness  Hernia: There is no hernia in the left inguinal area or right inguinal area  Genitourinary:     Comments: Breast and  exam deferred as will be following up with gynecologist  Musculoskeletal:         General: No swelling or tenderness  Normal range of motion  Cervical back: Full passive range of motion without pain, normal range of motion and neck supple  Right lower leg: No edema  Left lower leg: No edema  Lymphadenopathy:      Cervical:      Right cervical: No superficial or posterior cervical adenopathy  Left cervical: No superficial or posterior cervical adenopathy  Upper Body:      Right upper body: No supraclavicular or axillary adenopathy  Left upper body: No supraclavicular or axillary adenopathy  Lower Body: No right inguinal adenopathy  No left inguinal adenopathy  Skin:     General: Skin is warm and dry  Findings: No rash  Neurological:      General: No focal deficit present  Mental Status: She is alert and oriented to person, place, and time  Mental status is at baseline  GCS: GCS eye subscore is 4  GCS verbal subscore is 5  Motor: Motor function is intact  Coordination: Coordination is intact  Coordination normal       Deep Tendon Reflexes: Reflexes are normal and symmetric  Psychiatric:         Attention and Perception: Attention normal          Mood and Affect: Mood normal          Speech: Speech normal          Behavior: Behavior normal  Behavior is cooperative  Thought Content:  Thought content normal          Judgment: Judgment normal             Visual Acuity Screening    Right eye Left eye Both eyes   Without correction:      With correction: 20/20 20/20 20/20 Levar Rivera, [id number] [facility]

## 2021-06-14 NOTE — PATIENT INSTRUCTIONS
Wellness Visit for Adults   WHAT YOU NEED TO KNOW:   What is a wellness visit? A wellness visit is when you see your healthcare provider to get screened for health problems  Your healthcare provider will also give you advice on how to stay healthy  Write down your questions so you remember to ask them  Ask your healthcare provider how often you should have a wellness visit  What happens at a wellness visit? Your healthcare provider will ask about your health, and your family history of health problems  This includes high blood pressure, heart disease, and cancer  He or she will ask if you have symptoms that concern you, if you smoke, and about your mood  You may also be asked about your intake of medicines, supplements, food, and alcohol  Any of the following may be done:  · Your weight  will be checked  Your height may also be checked so your body mass index (BMI) can be calculated  Your BMI shows if you are at a healthy weight  · Your blood pressure  and heart rate will be checked  Your temperature may also be checked  · Blood and urine tests  may be done  Blood tests may be done to check your cholesterol levels  Abnormal cholesterol levels increase your risk for heart disease and stroke  You may also need a blood or urine test to check for diabetes if you are at increased risk  Urine tests may be done to look for signs of an infection or kidney disease  · A physical exam  includes checking your heartbeat and lungs with a stethoscope  Your healthcare provider may also check your skin to look for sun damage  · Screening tests  may be recommended  A screening test is done to check for diseases that may not cause symptoms  The screening tests you may need depend on your age, gender, family history, and lifestyle habits  For example, colorectal screening may be recommended if you are 48years old or older  What screening tests do I need if I am a woman?    · A Pap smear  is used to screen for cervical cancer  Pap smears are usually done every 3 to 5 years depending on your age  You may need them more often if you have had abnormal Pap smear test results in the past  Ask your healthcare provider how often you should have a Pap smear  · A mammogram  is an x-ray of your breasts to screen for breast cancer  Experts recommend mammograms every 2 years starting at age 48 years  You may need a mammogram at age 52 years or younger if you have an increased risk for breast cancer  Talk to your healthcare provider about when you should start having mammograms and how often you need them  What vaccines might I need? · Get an influenza vaccine  every year  The influenza vaccine protects you from the flu  Several types of viruses cause the flu  The viruses change over time, so new vaccines are made each year  · Get a tetanus-diphtheria (Td) booster vaccine  every 10 years  This vaccine protects you against tetanus and diphtheria  Tetanus is a severe infection that may cause painful muscle spasms and lockjaw  Diphtheria is a severe bacterial infection that causes a thick covering in the back of your mouth and throat  · Get a human papillomavirus (HPV) vaccine  if you are female and aged 23 to 32 or male 23 to 24 and never received it  This vaccine protects you from HPV infection  HPV is the most common infection spread by sexual contact  HPV may also cause vaginal, penile, and anal cancers  · Get a pneumococcal vaccine  if you are aged 72 years or older  The pneumococcal vaccine is an injection given to protect you from pneumococcal disease  Pneumococcal disease is an infection caused by pneumococcal bacteria  The infection may cause pneumonia, meningitis, or an ear infection  · Get a shingles vaccine  if you are 60 or older, even if you have had shingles before  The shingles vaccine is an injection to protect you from the varicella-zoster virus  This is the same virus that causes chickenpox   Shingles is a painful rash that develops in people who had chickenpox or have been exposed to the virus  How can I eat healthy? My Plate is a model for planning healthy meals  It shows the types and amounts of foods that should go on your plate  Fruits and vegetables make up about half of your plate, and grains and protein make up the other half  A serving of dairy is included on the side of your plate  The amount of calories and serving sizes you need depends on your age, gender, weight, and height  Examples of healthy foods are listed below:  · Eat a variety of vegetables  such as dark green, red, and orange vegetables  You can also include canned vegetables low in sodium (salt) and frozen vegetables without added butter or sauces  · Eat a variety of fresh fruits , canned fruit in 100% juice, frozen fruit, and dried fruit  · Include whole grains  At least half of the grains you eat should be whole grains  Examples include whole-wheat bread, wheat pasta, brown rice, and whole-grain cereals such as oatmeal     · Eat a variety of protein foods such as seafood (fish and shellfish), lean meat, and poultry without skin (turkey and chicken)  Examples of lean meats include pork leg, shoulder, or tenderloin, and beef round, sirloin, tenderloin, and extra lean ground beef  Other protein foods include eggs and egg substitutes, beans, peas, soy products, nuts, and seeds  · Choose low-fat dairy products such as skim or 1% milk or low-fat yogurt, cheese, and cottage cheese  · Limit unhealthy fats  such as butter, hard margarine, and shortening  How much exercise do I need? Exercise at least 30 minutes per day on most days of the week  Some examples of exercise include walking, biking, dancing, and swimming  You can also fit in more physical activity by taking the stairs instead of the elevator or parking farther away from stores  Include muscle strengthening activities 2 days each week   Regular exercise provides many health benefits  It helps you manage your weight, and decreases your risk for type 2 diabetes, heart disease, stroke, and high blood pressure  Exercise can also help improve your mood  Ask your healthcare provider about the best exercise plan for you  What are some general health and safety guidelines I should follow? · Do not smoke  Nicotine and other chemicals in cigarettes and cigars can cause lung damage  Ask your healthcare provider for information if you currently smoke and need help to quit  E-cigarettes or smokeless tobacco still contain nicotine  Talk to your healthcare provider before you use these products  · Limit alcohol  A drink of alcohol is 12 ounces of beer, 5 ounces of wine, or 1½ ounces of liquor  · Lose weight, if needed  Being overweight increases your risk of certain health conditions  These include heart disease, high blood pressure, type 2 diabetes, and certain types of cancer  · Protect your skin  Do not sunbathe or use tanning beds  Use sunscreen with a SPF 15 or higher  Apply sunscreen at least 15 minutes before you go outside  Reapply sunscreen every 2 hours  Wear protective clothing, hats, and sunglasses when you are outside  · Drive safely  Always wear your seatbelt  Make sure everyone in your car wears a seatbelt  A seatbelt can save your life if you are in an accident  Do not use your cell phone when you are driving  This could distract you and cause an accident  Pull over if you need to make a call or send a text message  · Practice safe sex  Use latex condoms if are sexually active and have more than one partner  Your healthcare provider may recommend screening tests for sexually transmitted infections (STIs)  · Wear helmets, lifejackets, and protective gear  Always wear a helmet when you ride a bike or motorcycle, go skiing, or play sports that could cause a head injury  Wear protective equipment when you play sports   Wear a lifejacket when you are on a boat or doing water sports  CARE AGREEMENT:   You have the right to help plan your care  Learn about your health condition and how it may be treated  Discuss treatment options with your healthcare providers to decide what care you want to receive  You always have the right to refuse treatment  The above information is an  only  It is not intended as medical advice for individual conditions or treatments  Talk to your doctor, nurse or pharmacist before following any medical regimen to see if it is safe and effective for you  © Copyright [address] Information is for End User's use only and may not be sold, redistributed or otherwise used for commercial purposes   All illustrations and images included in CareNotes® are the copyrighted property of [employer]  or [address] [place] [address]

## 2022-09-07 ENCOUNTER — OFFICE VISIT (OUTPATIENT)
Dept: FAMILY MEDICINE CLINIC | Facility: CLINIC | Age: 27
End: 2022-09-07
Payer: COMMERCIAL

## 2022-09-07 VITALS
SYSTOLIC BLOOD PRESSURE: 120 MMHG | HEIGHT: 61 IN | OXYGEN SATURATION: 100 % | BODY MASS INDEX: 25.86 KG/M2 | TEMPERATURE: 97.9 F | HEART RATE: 99 BPM | DIASTOLIC BLOOD PRESSURE: 80 MMHG | RESPIRATION RATE: 16 BRPM | WEIGHT: 137 LBS

## 2022-09-07 DIAGNOSIS — E27.8 OTHER SPECIFIED DISORDERS OF ADRENAL GLAND (HCC): ICD-10-CM

## 2022-09-07 DIAGNOSIS — J02.9 SORE THROAT: ICD-10-CM

## 2022-09-07 DIAGNOSIS — H66.92 LEFT OTITIS MEDIA, UNSPECIFIED OTITIS MEDIA TYPE: Primary | ICD-10-CM

## 2022-09-07 LAB — S PYO AG THROAT QL: NEGATIVE

## 2022-09-07 PROCEDURE — 3725F SCREEN DEPRESSION PERFORMED: CPT | Performed by: NURSE PRACTITIONER

## 2022-09-07 PROCEDURE — 99213 OFFICE O/P EST LOW 20 MIN: CPT | Performed by: NURSE PRACTITIONER

## 2022-09-07 PROCEDURE — 87880 STREP A ASSAY W/OPTIC: CPT | Performed by: NURSE PRACTITIONER

## 2022-09-07 RX ORDER — AMOXICILLIN 875 MG/1
875 TABLET, COATED ORAL 2 TIMES DAILY
Qty: 20 TABLET | Refills: 0 | Status: SHIPPED | OUTPATIENT
Start: 2022-09-07 | End: 2022-09-17

## 2022-09-07 RX ORDER — CETIRIZINE HYDROCHLORIDE 10 MG/1
10 TABLET ORAL DAILY
COMMUNITY

## 2022-09-07 NOTE — PATIENT INSTRUCTIONS
Amoxicillin (By mouth)   Amoxicillin ([id number]-apt-s-MITCH-in)  Treats infections or stomach ulcers  This medicine is a penicillin antibiotic  Brand Name(s): Moxatag, Prevpac   There may be other brand names for this medicine  When This Medicine Should Not Be Used: This medicine is not right for everyone  You should not use it if you had an allergic reaction to amoxicillin, any type of penicillin, or a cephalosporin antibiotic  How to Use This Medicine:   Capsule, Liquid, Tablet, Chewable Tablet, Long Acting Tablet  Your doctor will tell you how much medicine to use  Do not use more than directed  Chewable tablet: You must chew the tablet before you swallow it  You may crush the tablet and mix the medicine with a small amount of food to make it easier to swallow  Oral liquid: Shake well just before each use  Measure the oral liquid medicine with a marked measuring spoon, oral syringe, or medicine cup  You may mix the oral liquid with a baby formula, milk, fruit juice, water, ginger ale, or another cold drink  Be sure your child drinks all of the mixture right away  Tablet for suspension: Place the tablet in a small drinking glass, and add 2 teaspoons of water  Do not use any other liquid  Gently stir or swirl the water in the glass until the tablet is completely dissolved  Drink all of this mixture right away  Add more water to the glass and drink all of it to make sure you get all of the medicine  Do not chew or swallow the tablet for suspension  Take all of the medicine in your prescription to clear up your infection, even if you feel better after the first few doses  Take a dose as soon as you remember  If it is almost time for your next dose, wait until then and take a regular dose  Do not take extra medicine to make up for a missed dose  Store the tablets, capsules, and tablets for suspension at room temperature, away from heat, moisture, and direct light  Store the oral liquid in the refrigerator   Do not freeze  Throw away any unused medicine after 14 days  Drugs and Foods to Avoid:   Ask your doctor or pharmacist before using any other medicine, including over-the-counter medicines, vitamins, and herbal products  Some medicines can affect how amoxicillin works  Tell your doctor if you are also using any of the following:   Allopurinol  Probenecid  Birth control pills  A blood thinner  Warnings While Using This Medicine:   Tell your doctor if you are pregnant or breastfeeding, or if you have kidney disease, allergies, or a condition called phenylketonuria (PKU)  Tell your doctor if you are on dialysis  This medicine can cause diarrhea  Call your doctor if the diarrhea becomes severe, does not stop, or is bloody  Do not take any medicine to stop diarrhea until you have talked to your doctor  Diarrhea can occur 2 months or more after you stop taking this medicine  Tell any doctor or dentist who treats you that you are using this medicine  This medicine may affect certain medical test results  Call your doctor if your symptoms do not improve or if they get worse  Use this medicine to treat only the infection your doctor has prescribed it for  Do not use this medicine for any infection or condition that has not been checked by a doctor  This medicine will not treat the flu or the common cold  Keep all medicine out of the reach of children  Never share your medicine with anyone  Possible Side Effects While Using This Medicine:   Call your doctor right away if you notice any of these side effects:   Allergic reaction: Itching or hives, swelling in your face or hands, swelling or tingling in your mouth or throat, chest tightness, trouble breathing  Blistering, peeling, or red skin rash  Diarrhea that may contain blood, stomach cramps, fever  If you notice these less serious side effects, talk with your doctor:   Mild diarrhea, nausea, or vomiting  Mild skin rash  If you notice other side effects that you think are caused by this medicine, tell your doctor  Call your doctor for medical advice about side effects  You may report side effects to FDA at 0-843-OSA-1253    © Copyright [employer] [facility] 2022 Information is for End User's use only and may not be sold, redistributed or otherwise used for commercial purposes  The above information is an  only  It is not intended as medical advice for individual conditions or treatments  Talk to your doctor, nurse or pharmacist before following any medical regimen to see if it is safe and effective for you

## 2022-09-07 NOTE — PROGRESS NOTES
Assessment/Plan:    1  Left otitis media, unspecified otitis media type  -     amoxicillin (AMOXIL) 875 mg tablet; Take 1 tablet (875 mg total) by mouth 2 (two) times a day for 10 days    2  Other specified disorders of adrenal gland ([address] )  Comments:  her DHEA was elevated in past, endo wanted to r/o adrenal lesion and her MRI was denied and never followed back, denies any concerns now and stated that will be following with gynecologist soon and if they advise then will do further testing    3  Sore throat  -     POCT rapid strepA      Recent Results (from the past 24 hour(s))   POCT rapid strepA    Collection Time: 09/07/22  2:09 PM   Result Value Ref Range     RAPID STREP A Negative Negative         BMI Counseling: Body mass index is 25 89 kg/m²  Discussed the patient's BMI with her  The BMI is above normal  Nutrition recommendations include reducing portion sizes, decreasing overall calorie intake, 3-5 servings of fruits/vegetables daily, reducing fast food intake, consuming healthier snacks and decreasing soda and/or juice intake  Patient Instructions: Take medication with food  It is important that you take the entire course of antibiotics prescribed  May also take a probiotic of your choice to maintain healthy GI tamiko  Can take some probiotic and yogurt with the medication  Supportive care discussed and advised  Advised to RTO for any worsening and no improvement  Follow up for no improvement and worsening of conditions  Patient advised and educated when to see immediate medical care  Return if symptoms worsen or fail to improve  No future appointments  Subjective:      Patient ID: Aundrea Newby is a 24 y o  female      Chief Complaint   Patient presents with    Sore Throat     wmcma    Cough    Fatigue    Cold Like Symptoms         Vitals:  /80   Pulse 99   Temp 97 9 °F (36 6 °C)   Resp 16   Ht 5' 1" (1 549 m)   Wt 62 1 kg (137 lb)   SpO2 100%   BMI 25 89 kg/m² HPI  Patient started with scratchy throat on 9/4/2022 and then progressed to bodyaches and congestion and cough  Denies fever, chills and sob  Now also having left ear ache since yesterday  Fully vaccinated for covid-19 with one booster  Did covid-19 test yesterday which came back negative                PHQ-2/9 Depression Screening    Little interest or pleasure in doing things: 0 - not at all  Feeling down, depressed, or hopeless: 0 - not at all  PHQ-2 Score: 0  PHQ-2 Interpretation: Negative depression screen             The following portions of the patient's history were reviewed and updated as appropriate: allergies, current medications, past family history, past medical history, past social history, past surgical history and problem list       Review of Systems   Constitutional: Negative for chills, diaphoresis, fatigue, fever and unexpected weight change  HENT: Positive for congestion, ear pain and sore throat  Negative for dental problem, drooling, ear discharge, facial swelling, hearing loss, mouth sores, nosebleeds, postnasal drip, rhinorrhea, sinus pressure, sinus pain, sneezing, tinnitus, trouble swallowing and voice change  Respiratory: Positive for cough  Negative for chest tightness, shortness of breath and wheezing  Cardiovascular: Negative  Gastrointestinal: Negative for abdominal pain, constipation, diarrhea, nausea and vomiting  Musculoskeletal: Positive for myalgias  Skin: Negative  Neurological: Negative for dizziness, light-headedness and headaches           Objective:    Social History     Tobacco Use   Smoking Status Never Smoker   Smokeless Tobacco Never Used       Allergies: No Known Allergies      Current Outpatient Medications   Medication Sig Dispense Refill    amoxicillin (AMOXIL) 875 mg tablet Take 1 tablet (875 mg total) by mouth 2 (two) times a day for 10 days 20 tablet 0    cetirizine (ZyrTEC) 10 mg tablet Take 10 mg by mouth daily       No current facility-administered medications for this visit  Physical Exam  Vitals reviewed  Constitutional:       Appearance: Normal appearance  She is well-developed  HENT:      Head: Normocephalic  Right Ear: Ear canal and external ear normal  Tenderness present  Tympanic membrane is bulging  Left Ear: Tympanic membrane, ear canal and external ear normal       Nose: Nose normal       Right Sinus: No maxillary sinus tenderness or frontal sinus tenderness  Left Sinus: No maxillary sinus tenderness or frontal sinus tenderness  Mouth/Throat:      Mouth: No oral lesions  Pharynx: No oropharyngeal exudate or posterior oropharyngeal erythema  Cardiovascular:      Rate and Rhythm: Normal rate and regular rhythm  Heart sounds: Normal heart sounds  Pulmonary:      Effort: Pulmonary effort is normal       Breath sounds: Normal breath sounds  Musculoskeletal:         General: Normal range of motion  Cervical back: Neck supple  Lymphadenopathy:      Cervical:      Right cervical: No superficial or posterior cervical adenopathy  Left cervical: No superficial or posterior cervical adenopathy  Skin:     General: Skin is warm and dry  Neurological:      Mental Status: She is alert and oriented to person, place, and time  Psychiatric:         Behavior: Behavior normal          Thought Content:  Thought content normal          Judgment: Judgment normal                      ESTEPHANIA Hutton

## 2023-06-16 ENCOUNTER — OFFICE VISIT (OUTPATIENT)
Dept: FAMILY MEDICINE CLINIC | Facility: CLINIC | Age: 28
End: 2023-06-16
Payer: COMMERCIAL

## 2023-06-16 VITALS
DIASTOLIC BLOOD PRESSURE: 60 MMHG | SYSTOLIC BLOOD PRESSURE: 110 MMHG | TEMPERATURE: 98.7 F | HEART RATE: 90 BPM | WEIGHT: 145.6 LBS | OXYGEN SATURATION: 98 % | RESPIRATION RATE: 16 BRPM | HEIGHT: 61 IN | BODY MASS INDEX: 27.49 KG/M2

## 2023-06-16 DIAGNOSIS — H69.82 DYSFUNCTION OF LEFT EUSTACHIAN TUBE: Primary | ICD-10-CM

## 2023-06-16 DIAGNOSIS — E27.8 OTHER SPECIFIED DISORDERS OF ADRENAL GLAND (HCC): ICD-10-CM

## 2023-06-16 PROCEDURE — 99213 OFFICE O/P EST LOW 20 MIN: CPT | Performed by: NURSE PRACTITIONER

## 2023-06-16 NOTE — PROGRESS NOTES
"Assessment/Plan:    Discussed using Flonase nasal spray if discomfort continue  Monitor for any increased pain, drainage, hearing loss, recheck prn    1  Dysfunction of left eustachian tube    2  Other specified disorders of adrenal gland ([address] )             There are no Patient Instructions on file for this visit  No follow-ups on file  Subjective:      Patient ID: Anusha Irwin is a 34 y o  female  Chief Complaint   Patient presents with   • Earache     Left ear pain x 7 days, l Brooks/LPN       Here today with left ear discomfort  Last week, had intermittent sharp pains in the ear  Has had ear infections in the past, but this feels different        The following portions of the patient's history were reviewed and updated as appropriate: allergies, current medications, past family history, past medical history, past social history, past surgical history and problem list     Review of Systems   Constitutional: Negative for chills and fever  HENT:        See HPI   Respiratory: Negative  Cardiovascular: Negative  Neurological: Negative  Current Outpatient Medications   Medication Sig Dispense Refill   • cetirizine (ZyrTEC) 10 mg tablet Take 10 mg by mouth daily       No current facility-administered medications for this visit  Objective:    /60   Pulse 90   Temp 98 7 °F (37 1 °C) (Temporal)   Resp 16   Ht 5' 1\" (1 549 m)   Wt 66 kg (145 lb 9 6 oz)   LMP 05/17/2023 (Exact Date)   SpO2 98%   BMI 27 51 kg/m²        Physical Exam  Vitals and nursing note reviewed  Constitutional:       Appearance: Normal appearance  HENT:      Right Ear: Tympanic membrane and ear canal normal  No middle ear effusion  Tympanic membrane is not erythematous or bulging  Left Ear: Tympanic membrane and ear canal normal   No middle ear effusion  Tympanic membrane is not erythematous or bulging  Nose: Nose normal       Mouth/Throat:      Pharynx: Oropharynx is clear     Eyes:      " Conjunctiva/sclera: Conjunctivae normal    Cardiovascular:      Pulses: Normal pulses  Lymphadenopathy:      Cervical: No cervical adenopathy  Neurological:      Mental Status: She is alert     Psychiatric:         Mood and Affect: Mood normal          Behavior: Behavior normal                 ESTEPHANIA Reyna